# Patient Record
Sex: FEMALE | Race: WHITE | NOT HISPANIC OR LATINO
[De-identification: names, ages, dates, MRNs, and addresses within clinical notes are randomized per-mention and may not be internally consistent; named-entity substitution may affect disease eponyms.]

---

## 2019-10-16 ENCOUNTER — APPOINTMENT (OUTPATIENT)
Dept: PULMONOLOGY | Facility: CLINIC | Age: 24
End: 2019-10-16
Payer: COMMERCIAL

## 2019-10-16 DIAGNOSIS — R05 COUGH: ICD-10-CM

## 2019-10-16 PROBLEM — Z00.00 ENCOUNTER FOR PREVENTIVE HEALTH EXAMINATION: Status: ACTIVE | Noted: 2019-10-16

## 2019-10-16 PROCEDURE — 71046 X-RAY EXAM CHEST 2 VIEWS: CPT

## 2020-05-21 ENCOUNTER — TRANSCRIPTION ENCOUNTER (OUTPATIENT)
Age: 25
End: 2020-05-21

## 2020-10-02 ENCOUNTER — TRANSCRIPTION ENCOUNTER (OUTPATIENT)
Age: 25
End: 2020-10-02

## 2021-03-02 ENCOUNTER — EMERGENCY (EMERGENCY)
Facility: HOSPITAL | Age: 26
LOS: 1 days | Discharge: HOME | End: 2021-03-02
Admitting: EMERGENCY MEDICINE
Payer: COMMERCIAL

## 2021-03-02 VITALS
OXYGEN SATURATION: 99 % | RESPIRATION RATE: 19 BRPM | HEART RATE: 59 BPM | SYSTOLIC BLOOD PRESSURE: 114 MMHG | TEMPERATURE: 98 F | DIASTOLIC BLOOD PRESSURE: 76 MMHG

## 2021-03-02 DIAGNOSIS — Z20.822 CONTACT WITH AND (SUSPECTED) EXPOSURE TO COVID-19: ICD-10-CM

## 2021-03-02 LAB — SARS-COV-2 RNA SPEC QL NAA+PROBE: SIGNIFICANT CHANGE UP

## 2021-03-02 PROCEDURE — 99282 EMERGENCY DEPT VISIT SF MDM: CPT

## 2021-03-02 PROCEDURE — 99283 EMERGENCY DEPT VISIT LOW MDM: CPT

## 2021-03-02 PROCEDURE — U0003: CPT

## 2021-03-02 PROCEDURE — U0005: CPT

## 2021-03-02 NOTE — ED PROVIDER NOTE - PATIENT PORTAL LINK FT
You can access the FollowMyHealth Patient Portal offered by [facility] by registering at the following website: [email or web address]. By joining [employer]’s FollowMyHealth portal, you will also be able to view your health information using other applications (apps) compatible with our system.

## 2021-03-07 ENCOUNTER — EMERGENCY (EMERGENCY)
Facility: HOSPITAL | Age: 26
LOS: 1 days | Discharge: HOME | End: 2021-03-07
Admitting: EMERGENCY MEDICINE
Payer: COMMERCIAL

## 2021-03-07 VITALS
SYSTOLIC BLOOD PRESSURE: 105 MMHG | DIASTOLIC BLOOD PRESSURE: 69 MMHG | OXYGEN SATURATION: 98 % | RESPIRATION RATE: 18 BRPM | TEMPERATURE: 99 F | HEART RATE: 68 BPM

## 2021-03-07 DIAGNOSIS — Z20.822 CONTACT WITH AND (SUSPECTED) EXPOSURE TO COVID-19: ICD-10-CM

## 2021-03-07 LAB — SARS-COV-2 RNA SPEC QL NAA+PROBE: SIGNIFICANT CHANGE UP

## 2021-03-07 PROCEDURE — 99282 EMERGENCY DEPT VISIT SF MDM: CPT

## 2021-03-07 PROCEDURE — 99283 EMERGENCY DEPT VISIT LOW MDM: CPT

## 2021-03-07 PROCEDURE — U0005: CPT

## 2021-03-07 PROCEDURE — U0003: CPT

## 2021-03-07 NOTE — ED PROVIDER NOTE - PHYSICAL EXAMINATION
Physical Exam:    CONSTITUTIONAL:  Generally well appearing, non toxic, no acute distress, alert, awake and oriented  HEENT:  Moist mucous membranes, normal voice  PULM:  No accessory muscle use, speaking full sentences  SKIN:  Normal in appearance, normal color, not flushed

## 2021-03-07 NOTE — ED PROVIDER NOTE - OBJECTIVE STATEMENT
Patient is presenting to the Emergency Department with a request to have COVID-19 testing.    Patient denies fever, chills, sweats, body aches, headache, sore throat, runny nose, cough, shortness of breath or change in sense of taste or smell.

## 2021-03-07 NOTE — ED PROVIDER NOTE - CLINICAL SUMMARY MEDICAL DECISION MAKING FREE TEXT BOX
Patient is presenting to the Emergency Department requesting a COVID-19 test.    Patient denies any symptoms, has an unremarkable focused exam and looks well.   Nasopharyngeal PCR has been obtained and patient has been guided on how to obtain the results.    General COVID-19 discharge instructions have been given to the patient.

## 2021-03-07 NOTE — ED PROVIDER NOTE - PATIENT PORTAL LINK FT
You can access the FollowMyHealth Patient Portal offered by [facility] by registering at the following website: [email or web address]. By joining [employer]’s FollowMyHealth portal, you will also be able to view your health information using other applications (apps) compatible with our system.

## 2022-11-08 ENCOUNTER — NON-APPOINTMENT (OUTPATIENT)
Age: 27
End: 2022-11-08

## 2024-06-06 NOTE — ED PROVIDER NOTE - NS ED ROS FT
LVM for the patient to return call for r/s   WT 6/6/24   CONSTITUTIONAL:  No fever or chills, no bodyaches  HEENT:  No sore throat or headache, no nasal congestion  PULM:  No cough or shortness of breath  GI:  No diarrhea, no vomiting

## 2024-06-18 NOTE — ED ADULT TRIAGE NOTE - PATIENT ON (OXYGEN DELIVERY METHOD)
losartan-hydrochlorothiazide (HYZAAR) 100-12.5 MG per tablet 90 tablet 1 3/14/2024 --    Sig - Route: Take 1 tablet by mouth daily. - Oral      Refill request denied.    room air